# Patient Record
Sex: FEMALE | Race: WHITE | Employment: FULL TIME | URBAN - METROPOLITAN AREA
[De-identification: names, ages, dates, MRNs, and addresses within clinical notes are randomized per-mention and may not be internally consistent; named-entity substitution may affect disease eponyms.]

---

## 2024-09-10 ENCOUNTER — OFFICE VISIT (OUTPATIENT)
Age: 34
End: 2024-09-10

## 2024-09-10 VITALS
SYSTOLIC BLOOD PRESSURE: 96 MMHG | HEART RATE: 68 BPM | DIASTOLIC BLOOD PRESSURE: 61 MMHG | WEIGHT: 179 LBS | TEMPERATURE: 98.3 F | OXYGEN SATURATION: 98 %

## 2024-09-10 DIAGNOSIS — J02.9 SORE THROAT: ICD-10-CM

## 2024-09-10 DIAGNOSIS — J06.9 VIRAL URI: Primary | ICD-10-CM

## 2024-09-10 LAB
STREP PYOGENES DNA, POC: NEGATIVE
VALID INTERNAL CONTROL, POC: YES

## 2024-09-10 RX ORDER — VITAMIN A ACETATE, BETA CAROTENE, ASCORBIC ACID, CHOLECALCIFEROL, .ALPHA.-TOCOPHEROL ACETATE, DL-, THIAMINE MONONITRATE, RIBOFLAVIN, NIACINAMIDE, PYRIDOXINE HYDROCHLORIDE, FOLIC ACID, CYANOCOBALAMIN, CALCIUM CARBONATE, FERROUS FUMARATE, ZINC OXIDE, CUPRIC OXIDE 3080; 12; 120; 400; 1; 1.84; 3; 20; 22; 920; 25; 200; 27; 10; 2 [IU]/1; UG/1; MG/1; [IU]/1; MG/1; MG/1; MG/1; MG/1; MG/1; [IU]/1; MG/1; MG/1; MG/1; MG/1; MG/1
1 TABLET, FILM COATED ORAL DAILY
COMMUNITY

## 2024-10-10 ENCOUNTER — OFFICE VISIT (OUTPATIENT)
Age: 34
End: 2024-10-10

## 2024-10-10 VITALS
OXYGEN SATURATION: 97 % | HEART RATE: 83 BPM | SYSTOLIC BLOOD PRESSURE: 103 MMHG | TEMPERATURE: 98.3 F | RESPIRATION RATE: 16 BRPM | DIASTOLIC BLOOD PRESSURE: 66 MMHG | WEIGHT: 183 LBS

## 2024-10-10 DIAGNOSIS — B96.89 ACUTE BACTERIAL SINUSITIS: Primary | ICD-10-CM

## 2024-10-10 DIAGNOSIS — J01.90 ACUTE BACTERIAL SINUSITIS: Primary | ICD-10-CM

## 2024-10-10 RX ORDER — CEFDINIR 300 MG/1
300 CAPSULE ORAL 2 TIMES DAILY
Qty: 14 CAPSULE | Refills: 0 | Status: SHIPPED | OUTPATIENT
Start: 2024-10-10 | End: 2024-10-17

## 2024-10-10 NOTE — PATIENT INSTRUCTIONS
Symptoms consistent with acute bacterial sinusitis  Her vital signs are stable, physical exam does reveal some maxillary sinus tenderness  Symptoms are persisting and not improving over 2 weeks  I do encourage her to reach out to her OB/GYN for further advice regarding her symptoms during her pregnancy  I have prescribed Cefdinir to be taken 2x per day for 7 days, this is safe to take during pregnancy  Plain Mucinex OTC to help thin secretions  Saline sinus rinses, hot tea with honey, throat lozenges  Lots of fluids, plenty of rest  Return if symptoms persist or worsen  ED with any severe shortness of breath, chest pain, or if unable to tolerate food or fluids

## 2024-10-10 NOTE — PROGRESS NOTES
Kendra Hollins (:  1990) is a 34 y.o. female,Established patient, here for evaluation of the following chief complaint(s):  Cough (Cough, sinus congestion, x2 weeks. 32 weeks pregnant. )      Assessment & Plan :  Visit Diagnoses and Associated Orders       Acute bacterial sinusitis    -  Primary    cefdinir (OMNICEF) 300 MG capsule [[id number]]                   Symptoms consistent with acute bacterial sinusitis  Her vital signs are stable, physical exam does reveal some maxillary sinus tenderness, lung sounds are clear, low suspicion for pneumonia or bronchitis  Symptoms are persisting and not improving over 2 weeks  I do encourage her to reach out to her OB/GYN for further advice regarding her symptoms during her pregnancy  I have prescribed Cefdinir to be taken 2x per day for 7 days, this is safe to take during pregnancy  Plain Mucinex OTC to help thin secretions  Saline sinus rinses, hot tea with honey, throat lozenges  Lots of fluids, plenty of rest  Return if symptoms persist or worsen  ED with any severe shortness of breath, chest pain, or if unable to tolerate food or fluids       Subjective :  HPI     34 y.o. female presents with symptoms of persistent and slightly worsening sinus congestion and chest congestion over the past 2 weeks.  She is currently 32 weeks pregnant.  She was sick about 1 month ago with similar symptoms which felt like never fully resolved.  About 2 weeks ago she feels like her symptoms acutely worsened with worsening cough, chest congestion, sinus congestion and facial pressure.  She denies fevers, chills, body aches.  She does report some general fatigue and malaise.  Denies ear pain, denies significant throat pain.  She is taking Mucinex without any significant relief.  Her  is sick currently with similar symptoms.  She tested herself for COVID when her symptoms acutely worsened 2 weeks ago, this was negative.         Vitals:    10/10/24 0839   BP: 103/66   Site: Right